# Patient Record
Sex: FEMALE | Race: WHITE | Employment: UNEMPLOYED | URBAN - METROPOLITAN AREA
[De-identification: names, ages, dates, MRNs, and addresses within clinical notes are randomized per-mention and may not be internally consistent; named-entity substitution may affect disease eponyms.]

---

## 2017-01-30 ENCOUNTER — HOSPITAL ENCOUNTER (EMERGENCY)
Age: 37
Discharge: HOME | End: 2017-01-30
Attending: EMERGENCY MEDICINE | Admitting: EMERGENCY MEDICINE
Payer: SELF-PAY

## 2017-01-30 VITALS
WEIGHT: 131.31 LBS | SYSTOLIC BLOOD PRESSURE: 114 MMHG | OXYGEN SATURATION: 97 % | TEMPERATURE: 97.8 F | HEART RATE: 83 BPM | HEIGHT: 63 IN | BODY MASS INDEX: 23.27 KG/M2 | RESPIRATION RATE: 20 BRPM | DIASTOLIC BLOOD PRESSURE: 77 MMHG

## 2017-01-30 DIAGNOSIS — K08.89 DENTALGIA: Primary | ICD-10-CM

## 2017-01-30 PROCEDURE — 74011250636 HC RX REV CODE- 250/636: Performed by: EMERGENCY MEDICINE

## 2017-01-30 PROCEDURE — 96372 THER/PROPH/DIAG INJ SC/IM: CPT

## 2017-01-30 PROCEDURE — 74011250637 HC RX REV CODE- 250/637: Performed by: EMERGENCY MEDICINE

## 2017-01-30 PROCEDURE — 99282 EMERGENCY DEPT VISIT SF MDM: CPT

## 2017-01-30 RX ORDER — NAPROXEN 500 MG/1
500 TABLET ORAL 2 TIMES DAILY WITH MEALS
Qty: 20 TAB | Refills: 0 | Status: SHIPPED | OUTPATIENT
Start: 2017-01-30 | End: 2017-02-09

## 2017-01-30 RX ORDER — PENICILLIN V POTASSIUM 500 MG/1
500 TABLET, FILM COATED ORAL 2 TIMES DAILY
Qty: 20 TAB | Refills: 0 | Status: SHIPPED | OUTPATIENT
Start: 2017-01-30 | End: 2017-02-09

## 2017-01-30 RX ORDER — PENICILLIN V POTASSIUM 250 MG/1
500 TABLET, FILM COATED ORAL
Status: COMPLETED | OUTPATIENT
Start: 2017-01-30 | End: 2017-01-30

## 2017-01-30 RX ORDER — KETOROLAC TROMETHAMINE 30 MG/ML
30 INJECTION, SOLUTION INTRAMUSCULAR; INTRAVENOUS
Status: COMPLETED | OUTPATIENT
Start: 2017-01-30 | End: 2017-01-30

## 2017-01-30 RX ADMIN — KETOROLAC TROMETHAMINE 30 MG: 30 INJECTION, SOLUTION INTRAMUSCULAR at 12:43

## 2017-01-30 RX ADMIN — PENICILLIN V POTASIUM 500 MG: 250 TABLET ORAL at 12:43

## 2017-01-30 NOTE — ED NOTES
Consult: needs dental follow up, no insurance.    # of  Consult request within past 12 months:0  PCP Verified by CM: N/A  Current Support Network: Family  PCP/Family Physician referral and/or ER follow-up:  Y  Healthcare coverage Assistance referral:   N  Plan discussed with Pt/Family/Caregiver:  Y  Miscellaneous referral: N    Nii of interventions:   Consult conducted to assist PT with needs dental follow up, no insurance. Per Maddy Rao MD  Patient resides in [facility]. PT would contact [address] [place] [address] office directly to discuss co-pay arrangements and/or pi5fdeky [place] Medicaid to discuss dental coverage options. Also provided PT with upcoming free dental clinic on March 25, 2017 @ 6:30 AM [facility] [address] [facility]. Extended LC services at PTs discretion and expressed the importance of following through. PT expressed understanding. Contact office directly to discuss co-pay arrangements    [address] [facility]. [place] [phone]  [phone]    Also advised of the following: If you need assistance applying for [employer] or just have questions:  Online: Log on to www. HealthCare.gov  By phone: Linda White, 24 hours a day, 7 days a week ([phone])

## 2017-01-30 NOTE — ED NOTES
I have reviewed discharge instructions with the patient. The patient verbalized understanding. All discharge education and paperwork given.

## 2017-01-30 NOTE — ED PROVIDER NOTES
HPI Comments: 12:12 PM Marcellus Mckeon is a 39 y.o. female who presents to ED c/o dental pain onset approximately 2 weeks ago. Pt states that the pain radiates to the left ear. Pt has not been evaluated by a dentist due to insurance issues. Pt denies drainage from infected area of mouth, fever, NVD, or congestion. Denies difficulty swallowing or breathing. Pt denies any known allergies to medications. No other concerns at this time. LMP: 17  PCP: Veronica Steel MD            The history is provided by the patient. Past Medical History:   Diagnosis Date    Anemia NEC        Past Surgical History:   Procedure Laterality Date    Hx  section  3/26/2012     classical, abruption         Family History:   Problem Relation Age of Onset    Alcohol abuse Neg Hx     Arthritis-osteo Neg Hx     Asthma Neg Hx     Bleeding Prob Neg Hx     Cancer Neg Hx     Diabetes Neg Hx     Elevated Lipids Neg Hx     Headache Neg Hx     Heart Disease Neg Hx     Hypertension Neg Hx     Lung Disease Neg Hx     Migraines Neg Hx     Psychiatric Disorder Neg Hx     Stroke Neg Hx     Mental Retardation Neg Hx        Social History     Social History    Marital status: SINGLE     Spouse name: N/A    Number of children: N/A    Years of education: N/A     Occupational History    Not on file. Social History Main Topics    Smoking status: Current Some Day Smoker     Packs/day: 0.25     Years: 15.00    Smokeless tobacco: Never Used    Alcohol use Yes      Comment: occ    Drug use: No    Sexual activity: Yes     Partners: Male     Other Topics Concern    Not on file     Social History Narrative         ALLERGIES: Review of patient's allergies indicates no known allergies. Review of Systems   Constitutional: Negative for fever. HENT: Positive for dental problem (dental pain) and ear pain (left). Negative for congestion, trouble swallowing and voice change.     Eyes: Negative for visual disturbance. Respiratory: Negative for cough and shortness of breath. Cardiovascular: Negative for chest pain and leg swelling. Gastrointestinal: Negative for abdominal pain, nausea and vomiting. Genitourinary: Negative for dysuria. Musculoskeletal: Negative. Negative for back pain. Skin: Negative. Neurological: Negative for speech difficulty and headaches. All other systems reviewed and are negative. Vitals:    01/30/17 1211   BP: 114/77   Pulse: 83   Resp: 20   Temp: 97.8 °F (36.6 °C)   SpO2: 97%   Weight: 59.6 kg (131 lb 5 oz)   Height: 5' 3\" (1.6 m)            Physical Exam   Constitutional: She is oriented to person, place, and time. She appears well-developed and well-nourished. No distress. HENT:   Head: Normocephalic and atraumatic. Mouth/Throat: Oropharynx is clear and moist.   Tooth #16 and #17 are tender to palpation. Tooth #17 erroded. No large abscess or fluctuance. No induration of floor of mouth. No trismus. Eyes: EOM are normal. Pupils are equal, round, and reactive to light. Neck: Trachea normal and normal range of motion. Neck supple. Cardiovascular: Normal rate, regular rhythm, S1 normal and S2 normal.    Pulmonary/Chest: Effort normal. No accessory muscle usage. No respiratory distress. Abdominal: Soft. Normal appearance. She exhibits no distension. There is no rigidity. Musculoskeletal: Normal range of motion. She exhibits no edema or tenderness. Neurological: She is alert and oriented to person, place, and time. She has normal strength. No cranial nerve deficit or sensory deficit. Coordination normal.   Skin: Skin is warm and intact. No rash noted. Psychiatric: She has a normal mood and affect. Her speech is normal and behavior is normal.   Vitals reviewed. MDM  Number of Diagnoses or Management Options  Dentalgia:   Diagnosis management comments: Anaid Bonds is a 39 y.o. Female coming in with dental pain.  No evidence of large abscess or deep space infection. Will make  consult and treat with abx and antiinflammatories. ED Course       Procedures    Vitals:  Patient Vitals for the past 12 hrs:   Temp Pulse Resp BP SpO2   01/30/17 1211 97.8 °F (36.6 °C) 83 20 114/77 97 %     97% on RA, indicating adequate oxygenation. Medications ordered:   Medications   ketorolac (TORADOL) injection 30 mg (not administered)   penicillin v potassium (VEETID) tablet 500 mg (not administered)       Disposition:  Diagnosis:   1. Dentalgia        Disposition: discharged     Follow-up Information     Follow up With Details Comments [address] [address] Call on 1/30/2017 Please contact Jhonatan rios to discuss co-pay arrangements Jaylen Carson [phone] [facility] EMERGENCY DEPT Go to As needed, If symptoms worsen, or new symptoms. [address] [facility]  [phone]            Patient's Medications   Start Taking    NAPROXEN (NAPROSYN) 500 MG TABLET    Take 1 Tab by mouth two (2) times daily (with meals) for 10 days. PENICILLIN V POTASSIUM (VEETID) 500 MG TABLET    Take 1 Tab by mouth two (2) times a day for 10 days. Continue Taking    DIPHENHYDRAMINE (BENADRYL ALLERGY) 25 MG TABLET    Take one cap PO q 8 hours prn for itching    HYDROCODONE-ACETAMINOPHEN (NORCO) 5-325 MG PER TABLET    Take 1 Tab by mouth every four (4) hours as needed for Pain. Max Daily Amount: 6 Tabs. LITHIUM CARBONATE 300 MG TABLET    Take 300 mg by mouth three (3) times daily. PERMETHRIN (ACTICIN) 5 % TOPICAL CREAM    Massage into scalp after shampooing. Let sit for 15 minutes and then rinse. Apply to remaining skin area, keeping out of eyes, ears, nose, and mouth. Let sit for 10 to 14 hours. Rinse. VENLAFAXINE-SR (EFFEXOR XR) 150 MG CAPSULE    Take 150 mg by mouth daily.    These Medications have changed    No medications on file   Stop Taking    No medications on file     Scribe Attestation:     Delia Wade Marisabel, scribing for and in the presence of  Oksana Landers MD January 30, 2017 at 12:32 PM     Physician Attestation:   I personally performed the services described in this documentation, reviewed and edited the documentation which was dictated to the scribe in my presence, and it accurately records my words and actions.  Ciara Rose MD  January 30, 2017     Signed by: Erin Freeman, 01/30/17, 12:18 PM

## 2017-02-07 ENCOUNTER — HOSPITAL ENCOUNTER (EMERGENCY)
Age: 37
Discharge: HOME | End: 2017-02-07
Attending: EMERGENCY MEDICINE
Payer: SELF-PAY

## 2017-02-07 ENCOUNTER — APPOINTMENT (OUTPATIENT)
Dept: GENERAL RADIOLOGY | Age: 37
End: 2017-02-07
Attending: EMERGENCY MEDICINE
Payer: SELF-PAY

## 2017-02-07 VITALS
RESPIRATION RATE: 18 BRPM | OXYGEN SATURATION: 100 % | SYSTOLIC BLOOD PRESSURE: 136 MMHG | DIASTOLIC BLOOD PRESSURE: 90 MMHG | WEIGHT: 131 LBS | TEMPERATURE: 99 F | HEART RATE: 100 BPM | BODY MASS INDEX: 22.36 KG/M2 | HEIGHT: 64 IN

## 2017-02-07 DIAGNOSIS — S52.502A NONDISPLACED FRACTURE OF DISTAL END OF LEFT RADIUS, INITIAL ENCOUNTER: Primary | ICD-10-CM

## 2017-02-07 PROCEDURE — 73130 X-RAY EXAM OF HAND: CPT

## 2017-02-07 PROCEDURE — 75810000053 HC SPLINT APPLICATION

## 2017-02-07 PROCEDURE — 74011250637 HC RX REV CODE- 250/637: Performed by: EMERGENCY MEDICINE

## 2017-02-07 PROCEDURE — 99283 EMERGENCY DEPT VISIT LOW MDM: CPT

## 2017-02-07 RX ORDER — OXYCODONE AND ACETAMINOPHEN 5; 325 MG/1; MG/1
1 TABLET ORAL
Qty: 20 TAB | Refills: 0 | Status: SHIPPED | OUTPATIENT
Start: 2017-02-07 | End: 2017-02-14

## 2017-02-07 RX ORDER — OXYCODONE AND ACETAMINOPHEN 5; 325 MG/1; MG/1
2 TABLET ORAL
Status: COMPLETED | OUTPATIENT
Start: 2017-02-07 | End: 2017-02-07

## 2017-02-07 RX ADMIN — OXYCODONE HYDROCHLORIDE AND ACETAMINOPHEN 2 TABLET: 5; 325 TABLET ORAL at 19:56
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
## 2017-02-08 NOTE — DISCHARGE INSTRUCTIONS
Broken Wrist: Care Instructions  Your Care Instructions    Your wrist can break, or fracture, during sports, a fall, or other accidents. The break may happen when your wrist is hit or is used to protect you in a fall. Fractures can range from a small, hairline crack, to a bone or bones broken into two or more pieces. Your treatment depends on how bad the break is. Your doctor may have put your wrist in a cast or splint. This will help keep your wrist stable until your follow-up appointment. It may take weeks or months for your wrist to heal. You can help it heal with care at home. You heal best when you take good care of yourself. Eat a variety of healthy foods, and don't smoke. Follow-up care is a key part of your treatment and safety. Be sure to make and go to all appointments, and call your doctor if you are having problems. It's also a good idea to know your test results and keep a list of the medicines you take. How can you care for yourself at home? · Put ice or a cold pack on your wrist for 10 to 20 minutes at a time. Try to do this every 1 to 2 hours for the next 3 days (when you are awake). Put a thin cloth between the ice and your cast or splint. Keep your cast or splint dry. · Follow the splint or cast care instructions your doctor gives you. If you have a splint, do not take it off unless your doctor tells you to. Be careful not to put the splint on too tight. · Be safe with medicines. Take pain medicines exactly as directed. ¨ If the doctor gave you a prescription medicine for pain, take it as prescribed. ¨ If you are not taking a prescription pain medicine, ask your doctor if you can take an over-the-counter medicine. · Prop up your wrist on pillows when you sit or lie down in the first few days after the injury. Keep your wrist higher than the level of your heart. This will help reduce swelling.   · Move your fingers often to reduce swelling and stiffness, but do not use that hand to grab or carry anything. · Follow instructions for exercises to keep your arm strong. When should you call for help? Call your doctor now or seek immediate medical care if:  · You have increased or severe pain. · Your cast or splint feels too tight. · You cannot move your fingers. · You have tingling, weakness, or numbness in your hand and fingers. · Your hand and fingers are cool or pale or change color. · You have a lot of swelling near your cast or splint. · The skin under your cast or splint is burning or stinging. Watch closely for changes in your health, and be sure to contact your doctor if:  · You do not get better as expected. Where can you learn more? Go to http://vicky-juan antonio.info/. Enter [id number] in the search box to learn more about \"Broken Wrist: Care Instructions. \"  Current as of: May 23, 2016  Content Version: 11.1  © 9518-3960 [employer]. Care instructions adapted under license by [employer] (which disclaims liability or warranty for this information). If you have questions about a medical condition or this instruction, always ask your healthcare professional. [address] any warranty or liability for your use of this information.

## 2019-08-02 ENCOUNTER — OFFICE VISIT (OUTPATIENT)
Dept: FAMILY MEDICINE CLINIC | Facility: CLINIC | Age: 39
End: 2019-08-02

## 2019-08-02 VITALS
DIASTOLIC BLOOD PRESSURE: 70 MMHG | BODY MASS INDEX: 19.87 KG/M2 | SYSTOLIC BLOOD PRESSURE: 100 MMHG | RESPIRATION RATE: 15 BRPM | TEMPERATURE: 98 F | HEIGHT: 64 IN | WEIGHT: 116.4 LBS | OXYGEN SATURATION: 99 % | HEART RATE: 111 BPM

## 2019-08-02 DIAGNOSIS — F17.200 CONTINUOUS NICOTINE DEPENDENCE: ICD-10-CM

## 2019-08-02 DIAGNOSIS — C44.319 BASAL CELL CARCINOMA (BCC) OF SKIN OF OTHER PART OF FACE: Primary | ICD-10-CM

## 2019-08-02 DIAGNOSIS — F31.32 BIPOLAR AFFECTIVE DISORDER, CURRENTLY DEPRESSED, MODERATE (HCC): ICD-10-CM

## 2019-08-02 PROBLEM — C44.310 BASAL CELL CARCINOMA (BCC) OF SKIN OF FACE: Status: ACTIVE | Noted: 2019-08-02

## 2019-08-02 PROBLEM — F12.90 MARIJUANA USE, CONTINUOUS: Status: ACTIVE | Noted: 2019-08-02

## 2019-08-02 PROBLEM — R00.0 TACHYCARDIA: Status: ACTIVE | Noted: 2019-08-02

## 2019-08-02 NOTE — PROGRESS NOTES
Connie Santana is a 43 y.o.  female presents today for office visit for establish care. Pt would also like to discuss skin problem. Pt is not fasting. Pt is in Room # 3      1. Have you been to the ER, urgent care clinic since your last visit? Hospitalized since your last visit? No    2. Have you seen or consulted any other health care providers outside of the [address] [place] [address] since your last visit? Include any pap smears or colon screening. No    Upcoming Appts  none    Health Maintenance reviewed    VORB: No orders of the defined types were placed in this encounter.   Shira Pastor, JAMESN

## 2019-08-02 NOTE — PROGRESS NOTES
History and Physical    Today's Date:  2019   Patient's Name: Woody Samuel   Patient's :  1980     History:     Chief Complaint   Patient presents with    [facility]    Skin Problem     skin cancer on face starting to come back     Nicotine Dependence    Bipolar     Woody Samuel is a 43 y.o. female presenting for initial visit to establish [facility]. Will obtain records from previous provider to review. Care team updated on connect care. Basal cell carcinoma  This is a chronic problem, new to me. This is controlled. This is located on her forehead. Pt had it removed. Bipolar disorder  This is a chronic problem, new to me. This is not at goal. Pt takes no medication for this. Pt does not have a psychiatrist currently. Smoker  This is a chronic problem, new to me. This is not at goal. She smokes 0.25 ppd. She has smoked for 26 yrs.  Pt is not ready to quit. +marijuaa use    3 most recent PHQ Screens 2019   Little interest or pleasure in doing things Several days   Feeling down, depressed, irritable, or hopeless Nearly every day   Total Score PHQ 2 4   Trouble falling or staying asleep, or sleeping too much Nearly every day   Feeling tired or having little energy Nearly every day   Poor appetite, weight loss, or overeating More than half the days   Feeling bad about yourself - or that you are a failure or have let yourself or your family down Not at all   Trouble concentrating on things such as school, work, reading, or watching TV Nearly every day   Moving or speaking so slowly that other people could have noticed; or the opposite being so fidgety that others notice Several days   Thoughts of being better off dead, or hurting yourself in some way Not at all   PHQ 9 Score 16      Past Medical History:   Diagnosis Date    Anemia NEC     Basal cell carcinoma (BCC) of skin of face 2019    Bipolar affective disorder, currently depressed, moderate ([facility] [address] 2019    Continuous nicotine dependence 2019    Depression     Marijuana use, continuous 2019    Tachycardia 2019     Past Surgical History:   Procedure Laterality Date    HX  SECTION  3/26/2012    classical, abruption    HX SKIN BIOPSY        reports that she has been smoking. She has smoked for the past 26.00 years. She has never used smokeless tobacco. She reports that she drank alcohol. She reports that she has current or past drug history. Drug: Marijuana. Frequency: 2.00 times per week. Family History   Problem Relation Age of Onset    Other Mother         MS    Hypertension Mother     Hypertension Father     Alcohol abuse Neg Hx     Arthritis-osteo Neg Hx     Asthma Neg Hx     Bleeding Prob Neg Hx     Cancer Neg Hx     Diabetes Neg Hx     Elevated Lipids Neg Hx     Headache Neg Hx     Heart Disease Neg Hx     Lung Disease Neg Hx     Migraines Neg Hx     Psychiatric Disorder Neg Hx     Stroke Neg Hx     Mental Retardation Neg Hx      No Known Allergies    Problem List:      Patient Active Problem List   Diagnosis Code    Bipolar affective disorder, currently depressed, moderate ([facility]) F31.32    Continuous nicotine dependence F17.200    Basal cell carcinoma (BCC) of skin of face C44.310    Tachycardia R00.0    Marijuana use, continuous F12.90     Medications:     No current outpatient medications on file. No current facility-administered medications for this visit.       Review of Systems:   General:   fevers, chills  Neurologic: dizziness, lightheadedness  Eyes:  vision changes, double vision, photophobia  Ears:  change in hearing, ear pain, ear discharge, ear ringing  Nose:  sneezing, runny nose  Mouth/Throat: sore throat, voice change  Neck:  pain, stiffness  Respiratory: dyspnea at rest, dyspnea on exertion, wheezing, cough, sputum production  Cardiovascular:   chest pain, palpitations  Breasts: lumps, discharge, pain, rash  Gastrointestinal:  nausea, vomiting  Urinary: dysuria, urinary frequency, nocturia, malodorous urine  Genital (F): vaginal discharge, ulcerations  Musculoskeletal:  joint pain, back pain  Psychiatric: +insomnia, +anxiety  Endocrine: cold intolerance, heat intolerance  Hematologic: +easy bruising, easy bleeding  Dermatologic: Itching, rash    Physical Assessment:     Visit Vitals  /70 (BP 1 Location: Left arm, BP Patient Position: Sitting)   Pulse (!) 111   Temp 98 °F (36.7 °C) (Oral)   Resp 15   Ht 5' 4\" (1.626 m)   Wt 116 lb 6.4 oz (52.8 kg)   LMP 09/28/2015   SpO2 99%   BMI 19.98 kg/m²     General:   Well-groomed, well-nourished, in no distress, pleasant, appropriate and conversant. Eyes:    PERRL, conjunctiva clear  Ears:  TMs normal, no ear wax  Mouth:  oropharynx WNL without membranes, exudates, petechiae or ulcers  Cardiovascular:   RRR, no MRG. Pulmonary:   Lungs clear bilaterally. Normal respiratory effort. Abdomen:   Abdomen soft, NT, ND  Extremities:   No edema, LEs warm and well-perfused. Neuro:   Alert and oriented, no focal deficits. No facial asymmetry noted. Skin:    No rash or jaundice, +nodules on face  MSK:   Normal ROM, 5/5 muscle strength  Psych:  No pressured speech or abnormal thought content    Assessment/Plan & Orders:         ICD-10-CM ICD-9-CM    1. Basal cell carcinoma (BCC) of skin of other part of face C44.319 173.31 REFERRAL TO DERMATOLOGY   2. Bipolar affective disorder, currently depressed, moderate (HCC) F31.32 296.52    3.  Continuous nicotine dependence F17.200 305.1 CBC WITH AUTOMATED DIFF      LIPID PANEL      METABOLIC PANEL, COMPREHENSIVE      HEMOGLOBIN A1C WITH EAG      TSH AND FREE T4     HM  Colon cancer: colonoscopy due at age 47  Dyslipidemia: will check FLP  Diabetes mellitus: will check A1c  Influenza vaccine: due in the fall, pt declines  Pneumococcal vaccine: due, indication is smoking  Tdap: up to date  Herpes Zoster vaccine: due at age 60  Hep B vaccine: not indicated (liver dz, DM 19-59)  Weight:  Body mass index is 19.98 kg/m². Discussed the patient's BMI with her. The BMI follow up plan is as follows: maintain healthy weight   Cervical cancer:  pap smear due  Breast Cancer: mammogram at age 35  Osteoporosis: No indication for DEXA scan    Recommend smoking and marijuana cessation  Pt given a list of psychiatry providers for her to contact    Follow-up and Dispositions    · Return in about 3 months (around 11/2/2019) for Well woman exam, Go over lab/imaging results, Smoking cessation, Skin problem. *Patient verbalized understanding and agreement with the plan. Patient was given an after-visit summary. Jessica Puentes  [id number] MD - Internal Medicine  8/2/2019, 9:17 AM  [facility]  [address] [place] [address] [place], [address]  Phone [phone]  Fax [phone]

## 2019-08-30 ENCOUNTER — TELEPHONE (OUTPATIENT)
Dept: FAMILY MEDICINE CLINIC | Facility: CLINIC | Age: 39
End: 2019-08-30

## 2019-08-30 NOTE — LETTER
8/30/2019 12:58 PM 
 
Ms. Amee Montgomery [id number] [facility] [id number] Appointment was schedule for patient at [address] @1:30pm CAN NOT MISS THIS APT!! Per office. Sincerely, Zuri Martin MD

## 2019-08-30 NOTE — TELEPHONE ENCOUNTER
LPN tried to contact patient to give information on scheduled apt for [facility]. Pt can not miss apt per office. Sept 19th @1:30pm. Letter was sent.